# Patient Record
Sex: FEMALE | Race: WHITE
[De-identification: names, ages, dates, MRNs, and addresses within clinical notes are randomized per-mention and may not be internally consistent; named-entity substitution may affect disease eponyms.]

---

## 2018-04-22 ENCOUNTER — FORM ENCOUNTER (OUTPATIENT)
Age: 38
End: 2018-04-22

## 2018-08-14 PROBLEM — Z00.00 ENCOUNTER FOR PREVENTIVE HEALTH EXAMINATION: Status: ACTIVE | Noted: 2018-08-14

## 2018-08-17 ENCOUNTER — APPOINTMENT (OUTPATIENT)
Dept: CARDIOLOGY | Facility: CLINIC | Age: 38
End: 2018-08-17
Payer: COMMERCIAL

## 2018-08-17 VITALS
DIASTOLIC BLOOD PRESSURE: 68 MMHG | SYSTOLIC BLOOD PRESSURE: 105 MMHG | RESPIRATION RATE: 15 BRPM | HEIGHT: 68 IN | OXYGEN SATURATION: 99 % | BODY MASS INDEX: 20.16 KG/M2 | WEIGHT: 133 LBS | HEART RATE: 84 BPM

## 2018-08-17 DIAGNOSIS — R07.9 CHEST PAIN, UNSPECIFIED: ICD-10-CM

## 2018-08-17 DIAGNOSIS — Z82.49 FAMILY HISTORY OF ISCHEMIC HEART DISEASE AND OTHER DISEASES OF THE CIRCULATORY SYSTEM: ICD-10-CM

## 2018-08-17 DIAGNOSIS — Z87.891 PERSONAL HISTORY OF NICOTINE DEPENDENCE: ICD-10-CM

## 2018-08-17 DIAGNOSIS — Z84.89 FAMILY HISTORY OF OTHER SPECIFIED CONDITIONS: ICD-10-CM

## 2018-08-17 DIAGNOSIS — Z78.9 OTHER SPECIFIED HEALTH STATUS: ICD-10-CM

## 2018-08-17 DIAGNOSIS — R20.0 ANESTHESIA OF SKIN: ICD-10-CM

## 2018-08-17 DIAGNOSIS — M79.603 PAIN IN ARM, UNSPECIFIED: ICD-10-CM

## 2018-08-17 PROCEDURE — 99204 OFFICE O/P NEW MOD 45 MIN: CPT

## 2018-08-17 PROCEDURE — 93000 ELECTROCARDIOGRAM COMPLETE: CPT

## 2018-08-23 ENCOUNTER — APPOINTMENT (OUTPATIENT)
Dept: CARDIOLOGY | Facility: CLINIC | Age: 38
End: 2018-08-23
Payer: COMMERCIAL

## 2018-08-23 PROCEDURE — 93015 CV STRESS TEST SUPVJ I&R: CPT

## 2018-09-17 ENCOUNTER — APPOINTMENT (OUTPATIENT)
Dept: CARDIOLOGY | Facility: CLINIC | Age: 38
End: 2018-09-17

## 2018-10-03 ENCOUNTER — APPOINTMENT (OUTPATIENT)
Dept: CARDIOLOGY | Facility: CLINIC | Age: 38
End: 2018-10-03

## 2019-01-29 ENCOUNTER — APPOINTMENT (OUTPATIENT)
Dept: CARDIOLOGY | Facility: CLINIC | Age: 39
End: 2019-01-29

## 2019-04-23 ENCOUNTER — FORM ENCOUNTER (OUTPATIENT)
Age: 39
End: 2019-04-23

## 2021-04-29 ENCOUNTER — FORM ENCOUNTER (OUTPATIENT)
Age: 41
End: 2021-04-29

## 2021-11-02 ENCOUNTER — NON-APPOINTMENT (OUTPATIENT)
Age: 41
End: 2021-11-02

## 2021-11-02 DIAGNOSIS — Z87.448 PERSONAL HISTORY OF OTHER DISEASES OF URINARY SYSTEM: ICD-10-CM

## 2021-11-02 DIAGNOSIS — N60.02 SOLITARY CYST OF LEFT BREAST: ICD-10-CM

## 2021-11-02 DIAGNOSIS — R68.82 DECREASED LIBIDO: ICD-10-CM

## 2021-11-02 DIAGNOSIS — Z87.898 PERSONAL HISTORY OF OTHER SPECIFIED CONDITIONS: ICD-10-CM

## 2021-11-02 DIAGNOSIS — R39.14 FEELING OF INCOMPLETE BLADDER EMPTYING: ICD-10-CM

## 2021-11-02 RX ORDER — ALPRAZOLAM 0.25 MG/1
0.25 TABLET ORAL
Refills: 0 | Status: ACTIVE | COMMUNITY

## 2021-11-02 RX ORDER — SERTRALINE HYDROCHLORIDE 50 MG/1
50 TABLET, FILM COATED ORAL
Refills: 0 | Status: ACTIVE | COMMUNITY

## 2021-11-17 ENCOUNTER — APPOINTMENT (OUTPATIENT)
Dept: OBGYN | Facility: CLINIC | Age: 41
End: 2021-11-17
Payer: COMMERCIAL

## 2021-11-17 VITALS
DIASTOLIC BLOOD PRESSURE: 68 MMHG | SYSTOLIC BLOOD PRESSURE: 118 MMHG | WEIGHT: 164 LBS | BODY MASS INDEX: 24.86 KG/M2 | HEIGHT: 68 IN

## 2021-11-17 PROCEDURE — 99213 OFFICE O/P EST LOW 20 MIN: CPT

## 2021-11-17 NOTE — PLAN
[FreeTextEntry1] : Breast self-examination restrict caffeine in diet return in 6 months for repeat breast exam and yearly checkup

## 2021-11-17 NOTE — HISTORY OF PRESENT ILLNESS
[Patient reported mammogram was normal] : Patient reported mammogram was normal [Patient reported PAP Smear was normal] : Patient reported PAP Smear was normal [Patient reported bone density results were abnormal] : Patient reported bone density results were abnormal [Patient reported colonoscopy was normal] : Patient reported colonoscopy was normal [N] : Patient does not use contraception [Y] : Positive pregnancy history [FreeTextEntry1] : Patient with strong family history of breast cancer has history of fibrocystic breast she denies complaints has low caffeine diet instructed to really restrict salt in diet as well she has no breast complaints today.  Mammogram and sonogram have completed already this year and have been normal [Mammogramdate] : 05/21/19 [TextBox_25] : NONE [PapSmeardate] : 5/15/19 [BoneDensityDate] : 05/18/18 [TextBox_37] : OSTEOPENIA [ColonoscopyDate] : 2015 [TextBox_43] : 6 YEARS AGO  [LMPDate] : 11/17/21 [MensesFreq] : 29 [MensesLength] : 5-7 [PGHxTotal] : 2 [[facility]

## 2021-11-17 NOTE — PHYSICAL EXAM
[Chaperone Present] : A chaperone was present in the examining room during all aspects of the physical examination [Examination Of The Breasts] : a normal appearance [Normal] : normal [No Masses] : no breast masses were palpable

## 2022-06-01 ENCOUNTER — APPOINTMENT (OUTPATIENT)
Dept: OBGYN | Facility: CLINIC | Age: 42
End: 2022-06-01
Payer: COMMERCIAL

## 2022-06-01 VITALS
DIASTOLIC BLOOD PRESSURE: 68 MMHG | HEIGHT: 68 IN | SYSTOLIC BLOOD PRESSURE: 108 MMHG | WEIGHT: 165 LBS | BODY MASS INDEX: 25.01 KG/M2

## 2022-06-01 DIAGNOSIS — F41.9 ANXIETY DISORDER, UNSPECIFIED: ICD-10-CM

## 2022-06-01 DIAGNOSIS — Z80.3 FAMILY HISTORY OF MALIGNANT NEOPLASM OF BREAST: ICD-10-CM

## 2022-06-01 PROCEDURE — 99396 PREV VISIT EST AGE 40-64: CPT

## 2022-06-01 RX ORDER — CLOBETASOL PROPIONATE 0.5 MG/G
0.05 CREAM TOPICAL
Refills: 0 | Status: DISCONTINUED | COMMUNITY
End: 2022-06-01

## 2022-06-01 NOTE — HISTORY OF PRESENT ILLNESS
[Gonorrhea test offered] : Gonorrhea test offered [Chlamydia test offered] : Chlamydia test offered [Trichomonas test offered] : Trichomonas test offered [HPV test offered] : HPV test offered [N] : Patient does not use contraception [Y] : Positive pregnancy history [Currently Active] : currently active [Men] : men [TextBox_4] : PT PRESENTS TODAY FOR AN ANNUAL. [Mammogramdate] : 05/10/21 [TextBox_19] : BR2 [BreastSonogramDate] : 05/10/21 [TextBox_25] : BR2 [PapSmeardate] : 04/30/21 [TextBox_31] : NEGATIVE [GonorrheaDate] : 04/30/21 [TextBox_63] : NEGATIVE [ChlamydiaDate] : 04/30/21 [TextBox_68] : NEGATIVE [TrichomonasDate] : 04/30/21 [TextBox_73] : NEGATIVE [HPVDate] : 04/30/21 [TextBox_78] : NEGATIVE [LMPDate] : 05/23/22 [PGHxTotal] : 2 [[facility] [[facility]exLiving] : 2 [FreeTextEntry1] : 05/23/22

## 2022-06-01 NOTE — PHYSICAL EXAM
[Appropriately responsive] : appropriately responsive [Alert] : alert [No Acute Distress] : no acute distress [No Lymphadenopathy] : no lymphadenopathy [Regular Rate Rhythm] : regular rate rhythm [No Murmurs] : no murmurs [Clear to Auscultation B/L] : clear to auscultation bilaterally [Soft] : soft [Non-tender] : non-tender [Non-distended] : non-distended [No HSM] : No HSM [No Lesions] : no lesions [No Mass] : no mass [Oriented x3] : oriented x3 [Examination Of The Breasts] : a normal appearance [No Masses] : no breast masses were palpable [Labia Majora] : normal [Labia Minora] : normal [Normal] : normal [Uterine Adnexae] : normal [Declined] : Patient declined rectal exam [FreeTextEntry8] : No masses nontender bilateral

## 2022-06-01 NOTE — PLAN
[FreeTextEntry1] : Pap smear completed, breast self-exam taught, needs mammogram bilateral breast sonogram, will continue vitamin D3 1000 IUs/day will return in 12 months

## 2022-06-03 LAB
C TRACH RRNA SPEC QL NAA+PROBE: NOT DETECTED
HPV HIGH+LOW RISK DNA PNL CVX: NOT DETECTED
N GONORRHOEA RRNA SPEC QL NAA+PROBE: NOT DETECTED
SOURCE TP AMPLIFICATION: NORMAL

## 2022-06-06 LAB — CYTOLOGY CVX/VAG DOC THIN PREP: ABNORMAL

## 2022-06-07 DIAGNOSIS — B37.9 CANDIDIASIS, UNSPECIFIED: ICD-10-CM

## 2023-06-06 RX ORDER — FLUCONAZOLE 150 MG/1
150 TABLET ORAL
Qty: 1 | Refills: 0 | Status: DISCONTINUED | COMMUNITY
Start: 2022-06-07 | End: 2023-06-06

## 2023-06-07 ENCOUNTER — APPOINTMENT (OUTPATIENT)
Dept: OBGYN | Facility: CLINIC | Age: 43
End: 2023-06-07
Payer: COMMERCIAL

## 2023-06-07 VITALS
BODY MASS INDEX: 28.79 KG/M2 | DIASTOLIC BLOOD PRESSURE: 72 MMHG | WEIGHT: 190 LBS | SYSTOLIC BLOOD PRESSURE: 106 MMHG | HEART RATE: 74 BPM | HEIGHT: 68 IN

## 2023-06-07 DIAGNOSIS — Z11.3 ENCOUNTER FOR SCREENING FOR INFECTIONS WITH A PREDOMINANTLY SEXUAL MODE OF TRANSMISSION: ICD-10-CM

## 2023-06-07 DIAGNOSIS — Z12.39 ENCOUNTER FOR OTHER SCREENING FOR MALIGNANT NEOPLASM OF BREAST: ICD-10-CM

## 2023-06-07 PROCEDURE — 99396 PREV VISIT EST AGE 40-64: CPT

## 2023-06-07 NOTE — HISTORY OF PRESENT ILLNESS
[HIV test declined] : HIV test declined [Syphilis test declined] : Syphilis test declined [Gonorrhea test declined] : Gonorrhea test declined [Chlamydia test declined] : Chlamydia test declined [Trichomonas test declined] : Trichomonas test declined [HPV test declined] : HPV test declined [Hepatitis B test declined] : Hepatitis B test declined [Hepatitis C test declined] : Hepatitis C test declined [N] : Patient does not use contraception [Y] : Patient is sexually active [Normal Amount/Duration] :  normal amount and duration [Regular Cycle Intervals] : periods have been regular [Currently Active] : currently active [Men] : men [Vaginal] : vaginal [No] : No [TextBox_4] : ANNUAL\par LMP:5/12/2023 [Mammogramdate] : 06/13/22 [TextBox_19] : BR1 [BreastSonogramDate] : 6/13/22 [TextBox_25] : BR2 [PapSmeardate] : 6/1/22 [TextBox_31] : WNL [LMPDate] : 5/12/23 [MensesFreq] : 28 [MensesLength] : 5 [PGHxTotal] : 2 [facility] [[facility] [place]] : 2 [TextBox_6] : 5/12/23 [TextBox_9] : 13 [TextBox_13] : 28 [TextBox_15] : 5 [FreeTextEntry1] : 5/12/23

## 2023-06-07 NOTE — PLAN
[FreeTextEntry1] : Pap smear completed patient will have mammogram bilateral breast sonogram and she will follow-up with colonoscopy Dr. Handy she will return here in 12 months we discussed birth control she will continue with vitamin D3 and multivitamin

## 2023-06-12 LAB — CYTOLOGY CVX/VAG DOC THIN PREP: ABNORMAL

## 2024-06-12 ENCOUNTER — APPOINTMENT (OUTPATIENT)
Dept: OBGYN | Facility: CLINIC | Age: 44
End: 2024-06-12
Payer: COMMERCIAL

## 2024-06-12 ENCOUNTER — NON-APPOINTMENT (OUTPATIENT)
Age: 44
End: 2024-06-12

## 2024-06-12 VITALS
BODY MASS INDEX: 30.31 KG/M2 | HEIGHT: 68 IN | WEIGHT: 200 LBS | DIASTOLIC BLOOD PRESSURE: 77 MMHG | SYSTOLIC BLOOD PRESSURE: 123 MMHG

## 2024-06-12 DIAGNOSIS — N60.19 DIFFUSE CYSTIC MASTOPATHY OF UNSPECIFIED BREAST: ICD-10-CM

## 2024-06-12 DIAGNOSIS — Z01.419 ENCOUNTER FOR GYNECOLOGICAL EXAMINATION (GENERAL) (ROUTINE) W/OUT ABNORMAL FINDINGS: ICD-10-CM

## 2024-06-12 DIAGNOSIS — R92.30 DENSE BREASTS, UNSPECIFIED: ICD-10-CM

## 2024-06-12 DIAGNOSIS — Z11.51 ENCOUNTER FOR SCREENING FOR HUMAN PAPILLOMAVIRUS (HPV): ICD-10-CM

## 2024-06-12 PROCEDURE — 99396 PREV VISIT EST AGE 40-64: CPT

## 2024-06-12 NOTE — HISTORY OF PRESENT ILLNESS
[HIV test declined] : HIV test declined [Syphilis test declined] : Syphilis test declined [Gonorrhea test declined] : Gonorrhea test declined [Chlamydia test declined] : Chlamydia test declined [Trichomonas test declined] : Trichomonas test declined [HPV test declined] : HPV test declined [Hepatitis B test declined] : Hepatitis B test declined [Hepatitis C test declined] : Hepatitis C test declined [N] : Patient does not use contraception [Y] : Positive pregnancy history [Currently Active] : currently active [Men] : men [Vaginal] : vaginal [No] : No [Yes] : Yes [Patient reported colonoscopy was normal] : Patient reported colonoscopy was normal [Prior Menses:  ___ (Date)] : date of prior menstruation was [unfilled] [TextBox_4] : PT HERE FOR ANNUAL EXAM LMP: 5/28/24  [Mammogramdate] : 7/1/23 [TextBox_19] : BR1 [BreastSonogramDate] : 7/1/23 [TextBox_25] : BR2 [PapSmeardate] : 6/7/23 [TextBox_31] : WNL [ColonoscopyDate] : 5/2024 [LMPDate] : 5/28/24 [PGHxTotal] : 2 [[facility]] : 2 [[facility] : 2 [TextBox_6] : 5/28/24 [FreeTextEntry1] : 5/28/24

## 2024-06-12 NOTE — PLAN
[FreeTextEntry1] : Pap smear completed patient will have mammo bilateral breast sono she is up-to-date with her colonoscopy will continue vitamin D3 multivitamin weightbearing exercises return in 12 months

## 2024-06-12 NOTE — PHYSICAL EXAM
[Chaperone Present] : A chaperone was present in the examining room during all aspects of the physical examination [FreeTextEntry2] : DALILA LENOG [Appropriately responsive] : appropriately responsive [Alert] : alert [No Acute Distress] : no acute distress [No Lymphadenopathy] : no lymphadenopathy [Regular Rate Rhythm] : regular rate rhythm [No Murmurs] : no murmurs [Clear to Auscultation B/L] : clear to auscultation bilaterally [Soft] : soft [Non-tender] : non-tender [Non-distended] : non-distended [No HSM] : No HSM [No Lesions] : no lesions [No Mass] : no mass [Oriented x3] : oriented x3 [FreeTextEntry6] : No masses nontender [Examination Of The Breasts] : a normal appearance [Breast Palpation Diffuse Fibrous Tissue Bilateral] : fibrocystic changes [No Masses] : no breast masses were palpable [Labia Majora] : normal [Labia Minora] : normal [No Bleeding] : There was no active vaginal bleeding [Normal] : normal [Anteversion] : anteverted [Declined] : Patient declined rectal exam [Uterine Adnexae] : normal [FreeTextEntry8] : No masses nontender today

## 2024-06-17 LAB — CYTOLOGY CVX/VAG DOC THIN PREP: NORMAL

## 2025-07-16 ENCOUNTER — APPOINTMENT (OUTPATIENT)
Dept: OBGYN | Facility: CLINIC | Age: 45
End: 2025-07-16
Payer: COMMERCIAL

## 2025-07-16 ENCOUNTER — NON-APPOINTMENT (OUTPATIENT)
Age: 45
End: 2025-07-16

## 2025-07-16 VITALS
HEART RATE: 69 BPM | WEIGHT: 190 LBS | HEIGHT: 68 IN | DIASTOLIC BLOOD PRESSURE: 74 MMHG | SYSTOLIC BLOOD PRESSURE: 112 MMHG | BODY MASS INDEX: 28.79 KG/M2

## 2025-07-16 PROCEDURE — 99396 PREV VISIT EST AGE 40-64: CPT

## 2025-07-16 PROCEDURE — 99459 PELVIC EXAMINATION: CPT

## 2025-07-18 LAB
C TRACH RRNA SPEC QL NAA+PROBE: NOT DETECTED
N GONORRHOEA RRNA SPEC QL NAA+PROBE: NOT DETECTED
SOURCE TP AMPLIFICATION: NORMAL

## 2025-07-21 LAB
CYTOLOGY CVX/VAG DOC THIN PREP: NORMAL
HPV HIGH+LOW RISK DNA PNL CVX: NOT DETECTED